# Patient Record
(demographics unavailable — no encounter records)

---

## 2025-03-10 NOTE — ASSESSMENT
[FreeTextEntry1] : - She had a traumatic injury with the lower back and has right leg radicular complaints over the last 3 weeks.  She has had for other physician directed care still continues with weakness and difficulty ambulating. At this time for her acute symptoms I will prescribe tizanidine and hydrocodone.  Will get a stat MRI to evaluate acute herniated disc that may need surgical versus LESI treatments She will follow-up in our [facility] office with the spine team for review and delineation of treatment plan

## 2025-03-10 NOTE — IMAGING
[FreeTextEntry1] : Multilevel degenerative findings including narrowing at L5-S1, and spondylolisthesis at L4-L5 [de-identified] : Right total hip prosthesis is noted in acceptable position no evidence of loosening there is no fractures appreciated

## 2025-03-10 NOTE — HISTORY OF PRESENT ILLNESS
[Dull/Aching] : dull/aching [Localized] : localized [Tightness] : tightness [de-identified] :  03/10/2025: She was on vacation out of the country 3 weeks ago had been hiking and on a golf cart on bumpy terrain felt a twinge in the right side of her lower back.  Since that time she has acute pain in the lower back with radicular complaints difficulty ambulating.  For several days she was not able to get off of her back.  She was seen by provider at that time she was given prescriptions for 30 mg of prednisone that was tapered down over the course of 10 days.  He was also given baclofen gabapentin and tramadol with some help.  However she continues with radicular complaints to the right lower extremity and weakness and is ambulating with the assistance of a walker [] : no [FreeTextEntry1] : lower back, RT hip   [FreeTextEntry5] : lower back, RT hip pain that developed a few weeks ago. States that she had a RT hip replacement a while ago. Had seen Ortho in [facility] (out of the country).

## 2025-03-12 NOTE — HISTORY OF PRESENT ILLNESS
[Lower back] : lower back [de-identified] : 03/12/2025: MAGNO SARKAR is a 70 year-old female here for lower back pain. She saw Yinka Christine on 03/10/2025 and Went for a STAT MRI results done on 03/10/2025 at [facility].  3 weeks of LBP and RLE radic.  Was in [place] treated by Ortho in [facility]. Treated for calcific tendonitis.   Pain is radiating from back down to Right foot.  Has pain shooting down right leg.  Also dropped chair on right foot.   has more pins and needles and swelling in right foot.  Pain was so severe she was bedridden in [place] had to satay extra week so she could home. 2/21 did round of steroids. did not give any relief.  also on baclofen gabapentin and tramadol.

## 2025-03-12 NOTE — ASSESSMENT
[FreeTextEntry1] : 70F with LBP and spams.  Right side L5-s1 extruded fragment in neural foramen causing sever foraminal stenosis.   Discussed JENNIFER vs surgical deocmpression c/w gabapentin, vidocin Urgent  pain management appointment FU 6 weeks if pain persist consider L5-s1 lami/fusion/ r sided facetectomy

## 2025-03-13 NOTE — HISTORY OF PRESENT ILLNESS
[Lower back] : lower back [6] : 6 [10] : 10 [Burning] : burning [Dull/Aching] : dull/aching [Sharp] : sharp [Shooting] : shooting [Stabbing] : stabbing [Throbbing] : throbbing [Tingling] : tingling [Constant] : constant [Leisure] : leisure [Sleep] : sleep [Meds] : meds [Ice] : ice [Heat] : heat [Nothing helps with pain getting better] : Nothing helps with pain getting better [Sitting] : sitting [Standing] : standing [Walking] : walking [Lying in bed] : lying in bed [Retired] : Work status: retired [FreeTextEntry1] : 70 year F presents for initial evaluation regarding their low back pain.  Pain about 4 weeks, was on vacation and felt it after the golf course.   Current treatment: Norco, Tizanidine   Location: Right lower back into the right lateral thigh and leg Numbness/tingling: yes  Weakness: yes    Bowel/bladder dysfunction: Denies   Prior injections: Denies   Prior Treatments: MDP, NSAIDs, Tylenol   Pertinent Surgical History: Denies   Anticoagulation: Denies     Patient denies current infection, fevers, or chills. Physician Disclaimer: I have personally reviewed and confirmed all HPI data with the patient. [] : no [FreeTextEntry6] : numbness  [FreeTextEntry7] : right leg to the toes  [de-identified] : o/c

## 2025-03-13 NOTE — PHYSICAL EXAM
[de-identified] : General: Appears well nourished and well developed, no acute distress Musculoskeletal: Gait is non-antalgic, patient is ambulating without assistance Lumbar Spine Exam:                       Inspection:    erythema (-)   ecchymosis (-)   rashes (-)                         Palpation: Palpation/percussion at the midline lumbar levels reveals no tenderness                      ROM:    ROM - full range of motion with mild stiffness                           Strength Testin/5 in the bilateral lower extremities EXCEPT right foot dorsiflexion and EHL 4/5                      Reflexes: 2/2 in the bilateral lower extremities                      Sensation: Sensation to light touch grossly intact in the bilateral lower extremities                      Special Tests:  SLR: R (-) ; L (-), Facet loading: R (-) ; L (-)

## 2025-03-13 NOTE — DATA REVIEWED
Patient and spouse are going out of town to [facility] on 1/27/20. Patient has asked to refill script early before leaving due to being gone when medication is due to refill. Patient understands that script must last full 30 days.
[FreeTextEntry1] :  MRI images personally reviewed and reviewed with patient. L/S MRI OC 03/2025: Right foraminal Disc herniation with sequestered disc fragment in the right foraminal zone causing moderate severe right foraminal stenosis impingement of the exiting right L5 nerve root. Grade 1 anterolisthesis of L4 and L5 associated moderate moderate bilateral facet arthrosis. Multilevel additional spondylitic changes

## 2025-03-13 NOTE — ASSESSMENT
[FreeTextEntry1] : 70 year F presents with lower back pain.  Lumbar radiculopathy, weakness on exam, attempt TFESI  A discussion regarding available pain management treatment options occurred with the patient.  These included interventional, rehabilitative, pharmacological, and alternative modalities. We will proceed with the following:   Interventional treatment options: - Proceed with R L5-S1 TFESI with fluoroscopic guidance - If inadequate relief would likely consider ILESI - see additional instructions below      Rehabilitative options: - initiate trial of physical therapy - participation in active HEP was discussed and instructions provided   Medication based treatment options: - None    Complementary treatment options: - None   Additional treatment recommendations as follows: - patient to follow up with Dr. Souza - RTC after JENNIFER  Patient is presenting with acute/sub-acute pain with impairment in ADLs and functionality. The pain has not responded to at least 6 weeks of conservative care including NSAID therapy, OTC analgesics and/or physical therapy and/or home exercise program within the last 3 months.  The risks, benefits and alternatives of the proposed procedure were explained in detail with the patient. The risks outlined include but are not limited to infection, bleeding, post- dural puncture headache, nerve injury, a temporary increase in pain, failure to resolve symptoms, need for future interventions, allergic reaction, and possible elevation of blood sugar in diabetics if using corticosteroid.  All questions were answered to patient's apparent satisfaction, and he/she verbalized an understanding.

## 2025-03-26 NOTE — HISTORY OF PRESENT ILLNESS
[FreeTextEntry1] : Tabitha Mendoza is a 70-year-old female patient who presents today for initial office visit with her  present stating that approximately nine weeks ago, while in [place], she had a chair fall on her right foot by the second toe.  She states that she did not think too much of it, but approximately 2-3 weeks later, she had what was thought to be a hip injury, but turned out to be a herniated disc in the low back.  She states that since that time, she has been in a lot of pain.  She has been put on medications including a steroid pack, hydrocodone and gabapentin and is now on a muscle relaxer. She states that she was given the option of an epidural for the low back, but at this time is doing physical therapy and has been to approximately four visits.  She states that the foot is bothering her, and she is not exactly sure why.  She stated that she did have x-rays taken and was told it was not fractured.

## 2025-03-26 NOTE — PHYSICAL EXAM
[TextEntry] : Dorsalis pedis and posterior tibial pulses were palpable and equal bilat.  The capillary return was instant bilat.  There is minimal swelling but significant pain in the right forefoot.  Ecchymosis is absent.  Range of motion of the toe is complete.

## 2025-03-26 NOTE — ASSESSMENT
[FreeTextEntry1] : Assessment: Right foot contusion.  Plan: DP, medial oblique, and lateral weightbearing radiographs are taken of the right foot and reveal no fractures or dislocations.  I had a discussion with Tabitha and her .  I feel her issues are due to the low back injury and the diagnosis of a herniated disc.  I feel that that is causing pain throughout the foot from the pressure on the nerve.  She has been on multiple medications with no relief.  I encouraged her to continue physical therapy until which time she may opt for an epidural injection.

## 2025-03-31 NOTE — HEALTH RISK ASSESSMENT
[Retired] : retired [Graduate School] : graduate school [] :  [# Of Children ___] : has [unfilled] children [Very Good] : ~his/her~  mood as very good [No] : In the past 12 months have you used drugs other than those required for medical reasons? No Simple Simulation Preamble Text Will Be Included With Simple Simulations (.......... Indications): Simple simulation was performed today for the following reasons: [No falls in past year] : Patient reported no falls in the past year [Little interest or pleasure doing things] : 1) Little interest or pleasure doing things [Feeling down, depressed, or hopeless] : 2) Feeling down, depressed, or hopeless [1] : 2) Feeling down, depressed, or hopeless for several days (1) [PHQ-2 Negative - No further assessment needed] : PHQ-2 Negative - No further assessment needed [Former] : Former [0-4] : 0-4 [> 15 Years] : > 15 Years [NO] : No [HIV test declined] : HIV test declined [Hepatitis C test declined] : Hepatitis C test declined [None] : None [With Family] : lives with family [# of Members in Household ___] :  household currently consist of [unfilled] member(s) [Feels Safe at Home] : Feels safe at home [Fully functional (bathing, dressing, toileting, transferring, walking, feeding)] : Fully functional (bathing, dressing, toileting, transferring, walking, feeding) [Fully functional (using the telephone, shopping, preparing meals, housekeeping, doing laundry, using] : Fully functional and needs no help or supervision to perform IADLs (using the telephone, shopping, preparing meals, housekeeping, doing laundry, using transportation, managing medications and managing finances) [Independent] : managing finances [Reports normal functional visual acuity (ie: able to read med bottle)] : Reports normal functional visual acuity [Smoke Detector] : smoke detector [Carbon Monoxide Detector] : carbon monoxide detector [Safety elements used in home] : safety elements used in home [Seat Belt] :  uses seat belt [Sunscreen] : uses sunscreen [With Patient/Caregiver] : , with patient/caregiver [Designated Healthcare Proxy] : Designated healthcare proxy [Name: ___] : Health Care Proxy's Name: [unfilled]  [Relationship: ___] : Relationship: [unfilled] [de-identified] : PHYSICAL THERAPY EXERCISES [de-identified] : EATING BETTER [[id number]] : 2 [Change in mental status noted] : No change in mental status noted [Sexually Active] : not sexually active [High Risk Behavior] : no high risk behavior [Reports changes in hearing] : Reports no changes in hearing [Reports changes in vision] : Reports no changes in vision [Reports changes in dental health] : Reports no changes in dental health [Travel to Developing Areas] : does not  travel to developing areas [TB Exposure] : is not being exposed to tuberculosis [Caregiver Concerns] : does not have caregiver concerns [MammogramDate] : 03/25 [PapSmearDate] : 12/24 [BoneDensityDate] : 03/25 [BoneDensityComments] : REFERRED BY GYN TO SEE ENDOCRINOLOGY FOR OSTEOPENIA; EDWINA [ColonoscopyDate] : 10/22 [ColonoscopyComments] : DR. ZAC KELLEY; 2 YEAR FOLLOW-UP ADVISED [de-identified] : DIDN'T BRING GLASSES TODAY; HAS FOR DISTANCE [de-identified] : UPCOMING DENTAL APPOINTMENT [AdvancecareDate] : 03/25 [FreeTextEntry4] : Secondary Healthy Care Proxy: Daughter: Nataly Mendoza

## 2025-03-31 NOTE — HISTORY OF PRESENT ILLNESS
[FreeTextEntry1] : WELLNESS EXAM FOLLOW-UP HYPERTENSION, HYPERLIPIDEMIA [de-identified] : HERNIATED LUMBAR DISC.  GOING TO PHYSICAL THERAPY AND SEEING PAIN MANAGEMENT. WALKING WITH CANE FOR SUPPORT.    NOTICED RIGHT LOWER LEG SWOLLEN AFTER WALK[place] A LOT IN [place]ST [place].  PODIATRIST: DR. RAUSCH ORTHOPEDICS/PAIN MANAGEMENT: DR. HAAS DERMATOLOGY: DR. ISAIAS FARMER; [facility] CARDIOLOGY: DR. MAITE FRIEDMAN; [facility]: DR. HARTLEY OPHTHALMOLOGY: DR. FLANAGAN; [facility] GI: DR. ZAC KELLEY

## 2025-03-31 NOTE — PLAN
[FreeTextEntry1] : COLONOSCOPY VISION SCREENING SAFETY / HEALTHY HABITS REVIEWED HEALTHY DIET AND LIFESTYLE MODIFICATIONS VACCINATIONS DISCUSSED: TDAP, RSV, FLU TO CHECK ON WHAT PRIOR PNEUMONIA VACCIANTIONS RECIEVED WITH DATES TO DETERMINE IF UPDATED VACCINATION NEEDED CHECK ROUTINE LAB WORK WOULD LIKE TO HAVE EKG WITH CARDIOLOGIST FOLLOW-UP ALL SPECIALISTS AS DIRECTED  ADHERENCE TO OFFICE VISITS Q6 MONTHS FOR CHRONIC CARE NEEDS WILL GET UPDATED LIPIDS AND CMP BLOOD PRESSURE CONTROLLED PRESCRIPTIONS RENEWED CALL WITH ANY QUESTIONS, CONCERNS OR CHANGES

## 2025-04-10 NOTE — DATA REVIEWED
[FreeTextEntry1] :  MRI images personally reviewed and reviewed with patient. L/S MRI OC 03/2025: Right foraminal Disc herniation with sequestered disc fragment in the right foraminal zone causing moderate severe right foraminal stenosis impingement of the exiting right L5 nerve root. Grade 1 anterolisthesis of L4 and L5 associated moderate moderate bilateral facet arthrosis. Multilevel additional spondylitic changes

## 2025-04-10 NOTE — ASSESSMENT
[FreeTextEntry1] : 70 year F presents with lower back pain.  Lumbar radiculopathy, weakness on exam, mildly improved. At this time patient would like to continue conservative therapy, again notified her about the weakness and how it may become permanent. Attempt R L5-S1 TFESI in future if no relief with conservative therapy.  A discussion regarding available pain management treatment options occurred with the patient.  These included interventional, rehabilitative, pharmacological, and alternative modalities. We will proceed with the following:   Interventional treatment options: - None at this time - If inadequate relief would likely consider ILESI - see additional instructions below      Rehabilitative options: - continue physical therapy - participation in active HEP was discussed and instructions provided   Medication based treatment options: - None    Complementary treatment options: - Acupuncture - Medical massage   Additional treatment recommendations as follows: - patient to follow up with Dr. Souza - RTC in 4 weeks

## 2025-04-10 NOTE — HISTORY OF PRESENT ILLNESS
[Lower back] : lower back [6] : 6 [10] : 10 [Burning] : burning [Dull/Aching] : dull/aching [Sharp] : sharp [Shooting] : shooting [Stabbing] : stabbing [Throbbing] : throbbing [Tingling] : tingling [Constant] : constant [Leisure] : leisure [Sleep] : sleep [Meds] : meds [Ice] : ice [Heat] : heat [Nothing helps with pain getting better] : Nothing helps with pain getting better [Sitting] : sitting [Standing] : standing [Walking] : walking [Lying in bed] : lying in bed [Retired] : Work status: retired [FreeTextEntry1] : 70 year F presents for follow up evaluation regarding their low back pain.   Last seen 3/13, pending TFESI, wanted to try PT first. PT has been helping. Recently had vascular workup of her foot as well.   Current treatment: Denies  Location: Right lower back into the right lateral thigh and leg Numbness/tingling: yes  Weakness: yes    Bowel/bladder dysfunction: Denies   Prior injections: Denies   Prior Treatments: MDP, NSAIDs, Tylenol   Pertinent Surgical History: Denies   Anticoagulation: Denies     Patient denies current infection, fevers, or chills. Physician Disclaimer: I have personally reviewed and confirmed all HPI data with the patient. [] : no [FreeTextEntry6] : numbness, swollen  [FreeTextEntry7] : right leg to the toes  [FreeTextEntry9] : tylenol and advil  [de-identified] : o/c

## 2025-04-10 NOTE — PHYSICAL EXAM
[de-identified] : General: Appears well nourished and well developed, no acute distress Musculoskeletal: Gait is non-antalgic, patient is ambulating without assistance Lumbar Spine Exam:                       Inspection:    erythema (-)   ecchymosis (-)   rashes (-)                         Palpation: Palpation/percussion at the midline lumbar levels reveals no tenderness                      ROM:    ROM - full range of motion with mild stiffness                           Strength Testin/5 in the bilateral lower extremities EXCEPT right foot dorsiflexion and EHL 4+/5                      Reflexes: 2/2 in the bilateral lower extremities                      Sensation: Sensation to light touch grossly intact in the bilateral lower extremities                      Special Tests:  SLR: R (-) ; L (-), Facet loading: R (-) ; L (-)

## 2025-04-10 NOTE — HISTORY OF PRESENT ILLNESS
[Lower back] : lower back [6] : 6 [10] : 10 [Burning] : burning [Dull/Aching] : dull/aching [Sharp] : sharp [Shooting] : shooting [Stabbing] : stabbing [Throbbing] : throbbing [Tingling] : tingling [Constant] : constant [Leisure] : leisure [Sleep] : sleep [Meds] : meds [Ice] : ice [Heat] : heat [Nothing helps with pain getting better] : Nothing helps with pain getting better [Sitting] : sitting [Standing] : standing [Walking] : walking [Lying in bed] : lying in bed [Retired] : Work status: retired [FreeTextEntry1] : 70 year F presents for follow up evaluation regarding their low back pain.   Last seen 3/13, pending TFESI, wanted to try PT first. PT has been helping. Recently had vascular workup of her foot as well.   Current treatment: Denies  Location: Right lower back into the right lateral thigh and leg Numbness/tingling: yes  Weakness: yes    Bowel/bladder dysfunction: Denies   Prior injections: Denies   Prior Treatments: MDP, NSAIDs, Tylenol   Pertinent Surgical History: Denies   Anticoagulation: Denies     Patient denies current infection, fevers, or chills. Physician Disclaimer: I have personally reviewed and confirmed all HPI data with the patient. [] : no [FreeTextEntry6] : numbness, swollen  [FreeTextEntry7] : right leg to the toes  [FreeTextEntry9] : tylenol and advil  [de-identified] : o/c

## 2025-04-10 NOTE — PHYSICAL EXAM
[de-identified] : General: Appears well nourished and well developed, no acute distress Musculoskeletal: Gait is non-antalgic, patient is ambulating without assistance Lumbar Spine Exam:                       Inspection:    erythema (-)   ecchymosis (-)   rashes (-)                         Palpation: Palpation/percussion at the midline lumbar levels reveals no tenderness                      ROM:    ROM - full range of motion with mild stiffness                           Strength Testin/5 in the bilateral lower extremities EXCEPT right foot dorsiflexion and EHL 4+/5                      Reflexes: 2/2 in the bilateral lower extremities                      Sensation: Sensation to light touch grossly intact in the bilateral lower extremities                      Special Tests:  SLR: R (-) ; L (-), Facet loading: R (-) ; L (-)

## 2025-05-08 NOTE — PHYSICAL EXAM
[de-identified] : General: Appears well nourished and well developed, no acute distress Musculoskeletal: Gait is non-antalgic, patient is ambulating without assistance Lumbar Spine Exam:                       Inspection:    erythema (-)   ecchymosis (-)   rashes (-)                         Palpation: Palpation/percussion at the midline lumbar levels reveals no tenderness                      ROM:    ROM - full range of motion with mild stiffness                           Strength Testin/5 in the bilateral lower extremities EXCEPT right foot dorsiflexion and EHL 3+/5                      Reflexes: 2/2 in the bilateral lower extremities                      Sensation: Sensation to light touch grossly intact in the bilateral lower extremities                      Special Tests:  SLR: R (-) ; L (-), Facet loading: R (-) ; L (-)

## 2025-05-08 NOTE — HISTORY OF PRESENT ILLNESS
[Lower back] : lower back [6] : 6 [10] : 10 [Burning] : burning [Dull/Aching] : dull/aching [Sharp] : sharp [Shooting] : shooting [Stabbing] : stabbing [Throbbing] : throbbing [Tingling] : tingling [Constant] : constant [Leisure] : leisure [Sleep] : sleep [Meds] : meds [Ice] : ice [Heat] : heat [Nothing helps with pain getting better] : Nothing helps with pain getting better [Sitting] : sitting [Standing] : standing [Walking] : walking [Lying in bed] : lying in bed [Retired] : Work status: retired [4] : 4 [Intermittent] : intermittent [Social interactions] : social interactions [FreeTextEntry1] : 70 year F presents for follow up evaluation regarding their low back pain.   Last seen 04/10, wanted to try PT. Did PT, medical massage. The pain is better but it still bothers her.   Current treatment: Denies  Location: Right lower back into the right lateral thigh and leg Numbness/tingling: yes  Weakness: yes    Bowel/bladder dysfunction: Denies   Prior injections: Denies   Prior Treatments: MDP, NSAIDs, Tylenol   Pertinent Surgical History: Denies   Anticoagulation: Denies     Patient denies current infection, fevers, or chills. Physician Disclaimer: I have personally reviewed and confirmed all HPI data with the patient. [] : no [FreeTextEntry6] : numbness, swollen  [FreeTextEntry7] : right leg to the toes  [FreeTextEntry9] : tylenol and advil  [de-identified] : o/c

## 2025-05-08 NOTE — ASSESSMENT
[FreeTextEntry1] : 70 year F presents with lower back pain.  Lumbar radiculopathy, weakness on exam, no improvement. Again notified her about the weakness and how it may become permanent. Lumbar radiculopathy, attempt R L5-S1 TFESI   A discussion regarding available pain management treatment options occurred with the patient.  These included interventional, rehabilitative, pharmacological, and alternative modalities. We will proceed with the following:   Interventional treatment options: - Proceed with R L5-S1 TFESI - If inadequate relief would likely consider ILESI - see additional instructions below      Rehabilitative options: - continue physical therapy - participation in active HEP was discussed and instructions provided   Medication based treatment options: - None    Complementary treatment options: - Acupuncture - Medical massage   Additional treatment recommendations as follows: - patient to follow up with spine surgeon - RTC after JENNIFER  Patient is presenting with acute/sub-acute pain with impairment in ADLs and functionality. The pain has not responded to at least 6 weeks of conservative care including NSAID therapy, OTC analgesics and/or physical therapy and/or home exercise program within the last 3 months.  The risks, benefits and alternatives of the proposed procedure were explained in detail with the patient. The risks outlined include but are not limited to infection, bleeding, post- dural puncture headache, nerve injury, a temporary increase in pain, failure to resolve symptoms, need for future interventions, allergic reaction, and possible elevation of blood sugar in diabetics if using corticosteroid.  All questions were answered to patient's apparent satisfaction, and he/she verbalized an understanding.   The above documented care represents medical care services that are part of ongoing care related to this patient's serious or complex condition.

## 2025-05-23 NOTE — HISTORY OF PRESENT ILLNESS
[Lower back] : lower back [7] : 7 [Dull/Aching] : dull/aching [Sharp] : sharp [Constant] : constant [Household chores] : household chores [Leisure] : leisure [Nothing helps with pain getting better] : Nothing helps with pain getting better [Standing] : standing [Walking] : walking [de-identified] : 05/21/2025: 70 Y F presenting today for an initial evaluation. Chief complaint is R glute pain, great toe and 2nd toe paresthesia's and R foot weakness. C/o R sided lower back pain. Onset 01/21/25, she was in a golf cart and due to the repetitive bumps, caused start of episode. Pains so severe she was immobile, laying supine, then was using walker, now ambulating w/o assistive device. Initial severe RT sided sharp shooting pain.  Now noting RLE ache, swelling in R ankle. She reports a bothersome R buttock pain from a 2-5/10. Pt reports her symptoms are somewhat controlled at this time. Walking exacerbates her R foot weakness & causes inflammation.   She has treated with PT & medical massage. Medical massage not satisfactory. No longer treating w/ Tizanidine. Intermittent use of OTC Tylenol & Advil & Naproxen. At this time, she reports 70% improvement since start of episode, but pt reports that she is still not able to return back to normal routine of S[place]chez & pickleball. Referred here by Dr. Gonzalez.  Seen by Dr Souza. PMHx: controlled HBP & cholesterol.  [] : no [FreeTextEntry5] : 1/21/2025 pain originated from riding in a golf cart in [facility], was being bounced around and unable to get out of bed the following day.  Patient was seen by Dr. Gonzalez, Pain Management. [FreeTextEntry7] : rt ankle and toes [de-identified] : Dr. Gonzalez [de-identified] : LUMBAR MRI ZP

## 2025-05-23 NOTE — DATA REVIEWED
[FreeTextEntry1] : I have independently reviewed and interpreted these images and reports. 3/10/25 [facility] in house lumbar XRs- Grade 1 listhesis L4-5, spinal asymmetry on AP.   I have independently reviewed and interpreted these images and reports. 3/10/25 [facility]- Pelvis XR- R FERNANDO, mild L OA.   On my interpretation of these images from [facility] 3/10/25.  I have additionally reviewed the radiologist report. MRI-  L5-S1: adv RT, mod LT facet arthritis, mild R FS, RT sided moderate foraminal herniation providing compression to L5 nerve in RT foramen.  L4-5: adv BL facet arthritis, Grade 1 listhesis, mild stenosis.  L3-4: mod DDD, BL mod facet arthritis, mild stenosis.  L2-3: mild DDD, disc bulge, no stenosis.  L1-2: nl  T12-L1: nl

## 2025-05-23 NOTE — DISCUSSION/SUMMARY
[de-identified] : 70 Y M w/ lumbar HNP, lumbar radiculopathy. 80% improvement in symptoms since start of episode. RT EHL & RT eversion weakness on exam today. Will continue to monitor.   [facility] Lumbar MRI reviewed & discussed with patient today- L5-S1: adv RT, mod LT facet arthritis, mild R FS, RT sided moderate foraminal herniation providing compression to L5 nerve in RT foramen. Patient was educated and informed on their condition along with the expected outcomes.   We've discussed ALL appropriate treatment options including- non-operative measures (NSAIDs, acupuncture, chiropractic care, physical therapy, nerve modulators & spine ESIs) and operative measures, if refractory. Follow up with pain mgmt & proceed with RT L5-S1 injection if she wishes. Will exhaust ALL non-operative care.  No urgent need for surgeries.  Patient will continue with current physical therapy routine and incorporate activities taught into their daily life.   Discussed red flag signs and symptoms of worsening condition, when to call the office, and when to seek higher level of care.  F/U in 2-3 months if needed.   Prior to appointment and during encounter with patient extensive medical records were reviewed including but not limited to, hospital records, out patient records, imaging results, and lab data. During this appointment the patient was examined, diagnoses were discussed and explained in a face to face manner. In addition extensive time was spent reviewing aforementioned diagnostic studies. Counseling including abnormal image results, differential diagnoses, treatment options, risk and benefits, lifestyle changes, current condition, and current medications was performed. Patient's comments, questions, and concerns were address and patient verbalized understanding. Based on this patient's presentation at our office, which is an orthopedic spine surgeon's office, this patient inherently / intrinsically has a risk, however minute, of developing issues such as Cauda equina syndrome, bowel and bladder changes, or progression of motor or neurological deficits such as paralysis which may be permanent.     I, Nimco eNal, attest that this documentation has been prepared under the direction and in the presence of provider Marcelino Connors MD.

## 2025-05-23 NOTE — PHYSICAL EXAM
[4___] : right extensor hallicus longus 4[unfilled]/5 [NL (90)] : forward flexion 90 degrees [Flexion] : flexion [Extension] : extension [Bending to left] : bending to left [Bending to right] : bending to right [de-identified] : Constitutional: - General Appearance: Unremarkable Body Habitus Well Developed Well Nourished Body Habitus No Deformities Well Groomed Ability To communicate: Normal Neurologic: Global sensation is intact to upper and lower extremities. See examination of Neck and/or Spine for exceptions. Orientation to Time, Place and Person is: Normal Mood And Affect is Normal Skin: - Head/Face, Right Upper/Lower Extremity, Left Upper/Lower Extremity: Normal See Examination of Neck and/or Spine for exceptions Cardiovascular: Peripheral Cardiovascular System is Normal Palpation of Lymph Nodes: Normal Palpation of lymph nodes in: Axilla, Cervical, Inguinal Abnormal Palpation of lymph nodes in: None  [] : non-antalgic [FreeTextEntry9] : extension w/ crepitus  near full BL bending.   [de-identified] : R eversion 4+/5 [de-identified] : very mild RT SLR  [de-identified] : extension 10 degrees [de-identified] : left lateral bending 30 degrees [de-identified] : right lateral bending 30 degrees

## 2025-05-23 NOTE — PHYSICAL EXAM
[4___] : right extensor hallicus longus 4[unfilled]/5 [NL (90)] : forward flexion 90 degrees [Flexion] : flexion [Extension] : extension [Bending to left] : bending to left [Bending to right] : bending to right [de-identified] : Constitutional: - General Appearance: Unremarkable Body Habitus Well Developed Well Nourished Body Habitus No Deformities Well Groomed Ability To communicate: Normal Neurologic: Global sensation is intact to upper and lower extremities. See examination of Neck and/or Spine for exceptions. Orientation to Time, Place and Person is: Normal Mood And Affect is Normal Skin: - Head/Face, Right Upper/Lower Extremity, Left Upper/Lower Extremity: Normal See Examination of Neck and/or Spine for exceptions Cardiovascular: Peripheral Cardiovascular System is Normal Palpation of Lymph Nodes: Normal Palpation of lymph nodes in: Axilla, Cervical, Inguinal Abnormal Palpation of lymph nodes in: None  [] : non-antalgic [FreeTextEntry9] : extension w/ crepitus  near full BL bending.   [de-identified] : R eversion 4+/5 [de-identified] : very mild RT SLR  [de-identified] : extension 10 degrees [de-identified] : left lateral bending 30 degrees [de-identified] : right lateral bending 30 degrees
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
